# Patient Record
Sex: MALE | Race: WHITE | NOT HISPANIC OR LATINO | Employment: FULL TIME | URBAN - NONMETROPOLITAN AREA
[De-identification: names, ages, dates, MRNs, and addresses within clinical notes are randomized per-mention and may not be internally consistent; named-entity substitution may affect disease eponyms.]

---

## 2019-02-03 ENCOUNTER — HOSPITAL ENCOUNTER (EMERGENCY)
Facility: HOSPITAL | Age: 29
Discharge: HOME | End: 2019-02-03
Attending: EMERGENCY MEDICINE | Admitting: EMERGENCY MEDICINE
Payer: COMMERCIAL

## 2019-02-03 ENCOUNTER — APPOINTMENT (EMERGENCY)
Dept: CT IMAGING | Facility: HOSPITAL | Age: 29
End: 2019-02-03
Payer: COMMERCIAL

## 2019-02-03 VITALS
SYSTOLIC BLOOD PRESSURE: 113 MMHG | TEMPERATURE: 98 F | OXYGEN SATURATION: 99 % | RESPIRATION RATE: 18 BRPM | HEART RATE: 64 BPM | WEIGHT: 277.34 LBS | BODY MASS INDEX: 34.48 KG/M2 | HEIGHT: 75 IN | DIASTOLIC BLOOD PRESSURE: 64 MMHG

## 2019-02-03 DIAGNOSIS — S01.01XA SCALP LACERATION, INITIAL ENCOUNTER: Primary | ICD-10-CM

## 2019-02-03 PROCEDURE — 70450 CT HEAD/BRAIN W/O DYE: CPT

## 2019-02-03 PROCEDURE — 99283 EMERGENCY DEPT VISIT LOW MDM: CPT

## 2019-02-03 RX ORDER — CEPHALEXIN 500 MG/1
500 CAPSULE ORAL 4 TIMES DAILY
Qty: 28 CAPSULE | Refills: 0 | Status: SHIPPED | OUTPATIENT
Start: 2019-02-03 | End: 2019-02-10

## 2019-02-03 RX ORDER — LIDOCAINE HYDROCHLORIDE AND EPINEPHRINE 10; 10 MG/ML; UG/ML
10 INJECTION, SOLUTION INFILTRATION; PERINEURAL ONCE
Status: COMPLETED | OUTPATIENT
Start: 2019-02-03 | End: 2019-02-03

## 2019-02-03 RX ADMIN — LIDOCAINE HYDROCHLORIDE,EPINEPHRINE BITARTRATE 10 ML: 10; .01 INJECTION, SOLUTION INFILTRATION; PERINEURAL at 11:45

## 2019-02-03 NOTE — ED NOTES
Patients head laceration washed out and wrapped   Patient was given information on how to care for his wound and followup information, as well as CT results     Triny Diane RN  02/03/19 8473

## 2019-02-03 NOTE — ED PROCEDURE NOTE
Procedure  Lac Repair  Date/Time: 2/3/2019 1:17 PM  Performed by: Korina Nicole  Authorized by: Garry MANUEL   Consent: Verbal consent obtained  Consent given by: patient  Patient understanding: patient states understanding of the procedure being performed  Radiology Images displayed and confirmed   If images not available, report reviewed: imaging studies available  Patient identity confirmed: verbally with patient  Body area: head/neck  Location details: scalp  Laceration length: 9 cm  Foreign bodies: no foreign bodies  Tendon involvement: none  Nerve involvement: none    Anesthesia:  Local Anesthetic: lidocaine 1% with epinephrine  Anesthetic total: 10 mL    Sedation:  Patient sedated: no    Wound Dehiscence:  Superficial Wound Dehiscence: simple closure      Procedure Details:  Irrigation solution: saline  Irrigation method: jet lavage  Amount of cleaning: extensive  Debridement: none  Degree of undermining: none  Skin closure: staples  Technique: simple  Approximation: close  Approximation difficulty: simple  Comments: 12 staples placed                       Jose Keating PA-C  02/03/19 0104

## 2019-02-03 NOTE — DISCHARGE INSTRUCTIONS
Staple Care   WHAT YOU NEED TO KNOW:   How do I care for my wound? · Clean:      ¨ You may be able to shower in 24 hours  Do not soak your wound under water  ¨ Gently wash your wound with soap and warm water daily  Lightly pat it dry  Do not cover your wound unless your healthcare provider tells you to  ¨ You may also need to clean your wound with a mixture of hydrogen peroxide and water  Ask your healthcare provider how to do this  ¨ Do not apply ointment or cream to the wound unless your healthcare provider tells you to  · Elevate:      ¨ Rest any arm or leg that has a wound on pillows above the level of your heart  Do this as often as possible for 2 days  This will help decrease swelling and pain  · Minimize scarring:      ¨ Avoid sunshine on your wound to reduce scarring  When should I follow up with my healthcare provider? You may need to return for a wound checkup 3 days after your staples are placed  Ask your healthcare provider when to return to get your staples removed  Your healthcare provider will take your staples out as soon as possible to reduce scarring  Face staples may be removed within 3 to 5 days  Scalp, arm, and leg staples may be removed within 7 to 10 days  Joint, palm, and feet staples may be removed within 10 to 14 days  How will my staples be removed? · A medical staple remover  will be used to take out your staples  Your healthcare provider will slide the tool under each staple, squeeze the handle, and gently pull the staple out  · Medical tape  will be placed on your wound once your staples are removed  This will help keep your wound closed  The medical tape will fall off on its own after several days  When should I contact my healthcare provider? · You have redness, pain, swelling, or pus draining from your wound  · Your pain medicine does not relieve your pain  · You have a fever of 101°F (38 5°C) or higher      · You have an odor coming from your wound  · You have questions or concerns about your condition or care  When should I seek immediate care? · Your wound reopens  · You have red streaks on your skin that spread out from your wound  · You have severe pain or vomiting  CARE AGREEMENT:   You have the right to help plan your care  Learn about your health condition and how it may be treated  Discuss treatment options with your caregivers to decide what care you want to receive  You always have the right to refuse treatment  The above information is an  only  It is not intended as medical advice for individual conditions or treatments  Talk to your doctor, nurse or pharmacist before following any medical regimen to see if it is safe and effective for you  © 2017 [employer] Information is for End User's use only and may not be sold, redistributed or otherwise used for commercial purposes  All illustrations and images included in CareNotes® are the copyrighted property of [employer]  or [employer]dwell

## 2019-02-03 NOTE — ED NOTES
STAFF ASSIST to patient room  Aaron Neely PA-C at bedside with patient  Patient became lightheaded, diaphoretic during lidocaine infiltration  Patient awake, alert and oriented  Initially bradycardic        Rebecca Coulter RN  02/03/19 3624

## 2019-02-03 NOTE — ED PROVIDER NOTES
History  Chief Complaint   Patient presents with    Head Laceration     Pt reports he was cutting a mobile trailer apart and the roof collapsed on him  Pt denies any N/V/dizziness  No LOC  Patient presents to the emergency department today offering a chief complaint of a left sided head laceration  He presents via private vehicle with his father  He states they were cutting up a trailer when a piece of debris fallen left side of his head  He denies striking his neck or back  Denies loss of consciousness  Denies any pain whatsoever  No active bleeding  He states he is not nauseous and has not vomited  He is alert orient x4  No recent head injuries  He does not appear acutely toxic  None       History reviewed  No pertinent past medical history  Past Surgical History:   Procedure Laterality Date    KIDNEY SURGERY Left        History reviewed  No pertinent family history  I have reviewed and agree with the history as documented  Social History   Substance Use Topics    Smoking status: Current Some Day Smoker     Types: Cigars    Smokeless tobacco: Never Used    Alcohol use Yes      Comment: occasion        Review of Systems   Constitutional: Negative  HENT: Negative  Eyes: Negative  Respiratory: Negative  Cardiovascular: Negative  Gastrointestinal: Negative  Endocrine: Negative  Genitourinary: Negative  Musculoskeletal: Negative  Skin: Positive for wound  Allergic/Immunologic: Negative  Neurological: Negative  Hematological: Negative  Psychiatric/Behavioral: Negative  All other systems reviewed and are negative  Physical Exam  Physical Exam   Constitutional: He is oriented to person, place, and time  He appears well-developed and well-nourished  No distress  HENT:   Head: Normocephalic     Right Ear: External ear normal    Left Ear: External ear normal    Nose: Nose normal    Mouth/Throat: Oropharynx is clear and moist  No oropharyngeal exudate  No septal hematoma  Negative for hemotympanum bilaterally  Eyes: Pupils are equal, round, and reactive to light  Conjunctivae and EOM are normal    Neck: Normal range of motion  Cardiovascular: Normal rate  Pulmonary/Chest: Effort normal and breath sounds normal    Abdominal: Soft  There is no tenderness  Musculoskeletal: He exhibits no edema, tenderness or deformity  No tenderness over laceration site and no central spinal tenderness  Neurological: He is alert and oriented to person, place, and time  Skin: Skin is warm  Capillary refill takes less than 2 seconds  He is not diaphoretic  Vital Signs  ED Triage Vitals [02/03/19 1137]   Temperature Pulse Respirations Blood Pressure SpO2   98 °F (36 7 °C) 70 17 152/98 99 %      Temp Source Heart Rate Source Patient Position - Orthostatic VS BP Location FiO2 (%)   Temporal Monitor Sitting Left arm --      Pain Score       7           Vitals:    02/03/19 1137 02/03/19 1200   BP: 152/98 113/64   Pulse: 70 64   Patient Position - Orthostatic VS: Sitting Lying       Visual Acuity  Visual Acuity      Most Recent Value   L Pupil Size (mm)  2   R Pupil Size (mm)  2          ED Medications  Medications   lidocaine-epinephrine (XYLOCAINE/EPINEPHRINE) 1 %-1:100,000 injection 10 mL (10 mL Infiltration Given 2/3/19 1145)       Diagnostic Studies  Results Reviewed     None                 CT head without contrast   Final Result by Billie Vo DO (02/03 1207)      No acute intracranial abnormality  Mild mucosal thickening of the paranasal sinuses                    Workstation performed: [id number]                    Procedures  Procedures       Phone Contacts  ED Phone Contact    ED Course  ED Course as of Feb 03 1319   Sun Feb 03, 2019   1149 Blood Pressure: 152/98   1149 Temperature: 98 °F (36 7 °C)   1149 Pulse: 70   1149 Respirations: 17   1149 SpO2: 99 %   1206 While administration of localized lidocaine with epinephrine was taking placed patient became diaphoretic and weak  We did lay him back  Patient is on the monitor  1232 FINDINGS:    PARENCHYMA:  No intracranial mass, mass effect or midline shift  No CT signs of acute infarction   No acute parenchymal hemorrhage  VENTRICLES AND EXTRA-AXIAL SPACES:  Normal for the patient's age  VISUALIZED ORBITS AND PARANASAL SINUSES:  Unremarkable orbits   Mild mucosal thickening of the inferior frontal sinuses and ethmoid air cells  CALVARIUM AND EXTRACRANIAL SOFT TISSUES:  Normal   Impression       No acute intracranial abnormality       Mild mucosal thickening of the paranasal sinuses  MDM    Disposition  Final diagnoses:   Scalp laceration, initial encounter     Time reflects when diagnosis was documented in both MDM as applicable and the Disposition within this note     Time User Action Codes Description Comment    2/3/2019 11:54 AM [employer] Add [S01 01XA] Scalp laceration, initial encounter       ED Disposition     ED Disposition Condition Date/Time Comment    Discharge  Sun Feb 3, 2019  1:18 PM Ellen Philip discharge to home/self care  Condition at discharge: Good        Follow-up Information     Follow up With Specialties Details Why Contact [facility]  Schedule an appointment as soon as possible for a visit For wound re-check [address] [place] [address]  In 10 days For staple removal [address] [place] [id number]            Patient's Medications   Discharge Prescriptions    CEPHALEXIN (KEFLEX) 500 MG CAPSULE    Take 1 capsule (500 mg total) by mouth 4 (four) times a day for 7 days       Start Date: 2/3/2019  End Date: 2/10/2019       Order Dose: 500 mg       Quantity: 28 capsule    Refills: 0     No discharge procedures on file      ED Provider  Electronically Signed by           Efrain Johnson PA-C  02/03/19 2701